# Patient Record
Sex: MALE | Race: WHITE | Employment: OTHER | URBAN - METROPOLITAN AREA
[De-identification: names, ages, dates, MRNs, and addresses within clinical notes are randomized per-mention and may not be internally consistent; named-entity substitution may affect disease eponyms.]

---

## 2017-01-11 NOTE — PATIENT DISCUSSION
Diabetes without Retinopathy Counseling:  I have discussed with the patient the importance of controlling blood glucose, blood pressure and lipid levels to minimize the risk of developing retinal disease from diabetes. Explained the importance of annual dilated eye exams because effective treatment for diabetic retinopathy depends on timely intervention. The patient was instructed to call or return sooner if they noticed blurred or distorted vision, fluctuating vision, pain or redness around one or both eyes. Return for follow-up as scheduled.

## 2017-01-11 NOTE — PATIENT DISCUSSION
Continue: Refresh Tears (carboxymethylcellulose sodium): drops: 0.5% 1 drop once a day as needed into both eyes

## 2017-01-11 NOTE — PATIENT DISCUSSION
GLAUCOMA SUSPECT, OU : ENLARGED OPTIC NERVE CUPPING AND POSITIVE FAMILY HISTORY. RETURN FOR FOLLOW UP AS SCHEDULED.

## 2017-01-11 NOTE — PATIENT DISCUSSION
*PCF OS; BECOMING VISUALLY SIGNIFICANT BUT NOT BOTHERSOME TO PATIENT AT THIS TIME. CONTINUE TO FOLLOW FOR NOW. OFFER SPEC RX UPDATE.

## 2018-03-27 ENCOUNTER — CONSULTATION (OUTPATIENT)
Dept: URBAN - METROPOLITAN AREA CLINIC 11 | Facility: CLINIC | Age: 83
End: 2018-03-27

## 2018-03-27 VITALS — DIASTOLIC BLOOD PRESSURE: 80 MMHG | HEIGHT: 60 IN | SYSTOLIC BLOOD PRESSURE: 120 MMHG

## 2018-03-27 ASSESSMENT — TONOMETRY
OS_IOP_MMHG: 16
OD_IOP_MMHG: 18

## 2018-03-27 ASSESSMENT — VISUAL ACUITY
OD_SC: 20/40
OS_SC: 20/30

## 2018-04-02 NOTE — PATIENT DISCUSSION
Continue: Combigan (brimonidine-timolol): drops: 0.2-0.5% 1 drop twice a day as directed into left eye

## 2018-04-02 NOTE — PATIENT DISCUSSION
EXPOSURE KERATOCONJUNCTIVITIS - ADD EMYCIN DELFINO QHS. AT'S' QID   ADD WARM COMPRESSES AND EYELID SCRUBS DAILY.  WILL RECOMMEND CONSIDER PUNCTAL PLUGS  IF NOT RESPONSIVE

## 2018-04-24 ENCOUNTER — FOLLOW UP (OUTPATIENT)
Dept: URBAN - METROPOLITAN AREA CLINIC 11 | Facility: CLINIC | Age: 83
End: 2018-04-24

## 2018-04-24 ASSESSMENT — VISUAL ACUITY
OD_PH: 20/30-3
OD_SC: 20/40
OS_SC: 20/20-2

## 2018-04-24 ASSESSMENT — TONOMETRY: OD_IOP_MMHG: 23

## 2018-05-04 NOTE — PATIENT DISCUSSION
MEIBOMIAN GLAND DYSFUNCTION, OU: CONTINUE WARM COMPRESSES AND EYELID SCRUBS QD-BID, ARTIFICIAL TEARS BID-QID, THE DAILY INTAKE OF OMEGA-3 FATTY ACIDS

## 2018-05-04 NOTE — PATIENT DISCUSSION
SECONDARY GLAUCOMA WITH IRIS NV OS - LIKELY RELATED TO DIABETIC RETINOPATHY. CONTINUE COMBIGAN BID OU. REFER TO DR Shakeel Willis.

## 2018-05-04 NOTE — PATIENT DISCUSSION
Hector Nieves M.D.: Reason: [facility] AND [address] [place] [address]

## 2018-10-09 NOTE — PATIENT DISCUSSION
Patient presents w/ significant lag and exposure of the left eye. Recommend aggressive lubrication near term. Consider possible tarsorrhaphy if symptoms do not improve or worsen. Follow up w/ Dr. Chavez Form in one month. Will need approval from Power of  if/when a procedure is recommended.

## 2018-11-09 NOTE — PATIENT DISCUSSION
SECONDARY GLAUCOMA WITH IRIS NV OS - LIKELY RELATED TO DIABETIC RETINOPATHY. STOP  COMBIGAN AND LATANOPROST. CONTINUE ZIOPTAN QHS ANS COSOPT BID OU AND ALPHAGAN TID OU. Simon Pinna Dr. Fanny Nyhan.

## 2018-12-14 ENCOUNTER — FOLLOW UP (OUTPATIENT)
Dept: URBAN - METROPOLITAN AREA CLINIC 11 | Facility: CLINIC | Age: 83
End: 2018-12-14

## 2018-12-17 ASSESSMENT — VISUAL ACUITY
OS_SC: 20/30+2
OD_SC: 20/40+2

## 2018-12-17 ASSESSMENT — TONOMETRY: OD_IOP_MMHG: 19

## 2019-06-06 ENCOUNTER — FOLLOW UP (OUTPATIENT)
Dept: URBAN - METROPOLITAN AREA CLINIC 11 | Facility: CLINIC | Age: 84
End: 2019-06-06

## 2019-06-06 ASSESSMENT — VISUAL ACUITY
OD_SC: 20/30+1
OS_SC: 20/30+2

## 2019-06-06 ASSESSMENT — TONOMETRY
OS_IOP_MMHG: 12
OD_IOP_MMHG: 15

## 2019-07-11 ENCOUNTER — FOLLOW UP (OUTPATIENT)
Dept: URBAN - METROPOLITAN AREA CLINIC 11 | Facility: CLINIC | Age: 84
End: 2019-07-11

## 2019-07-11 ASSESSMENT — VISUAL ACUITY
OS_SC: 20/30+2
OD_SC: 20/30

## 2019-08-14 ENCOUNTER — IMPORTED ENCOUNTER (OUTPATIENT)
Dept: URBAN - METROPOLITAN AREA CLINIC 9 | Facility: CLINIC | Age: 84
End: 2019-08-14

## 2019-10-11 ENCOUNTER — IMPORTED ENCOUNTER (OUTPATIENT)
Dept: URBAN - METROPOLITAN AREA CLINIC 9 | Facility: CLINIC | Age: 84
End: 2019-10-11

## 2019-10-22 ENCOUNTER — FOLLOW UP (OUTPATIENT)
Dept: URBAN - METROPOLITAN AREA CLINIC 11 | Facility: CLINIC | Age: 84
End: 2019-10-22

## 2019-10-22 ASSESSMENT — TONOMETRY
OD_IOP_MMHG: 19
OS_IOP_MMHG: 17

## 2019-10-22 ASSESSMENT — VISUAL ACUITY
OD_SC: 20/30-2
OS_SC: 20/30-1

## 2019-12-05 ENCOUNTER — FOLLOW UP (OUTPATIENT)
Dept: URBAN - METROPOLITAN AREA CLINIC 11 | Facility: CLINIC | Age: 84
End: 2019-12-05

## 2019-12-05 ASSESSMENT — VISUAL ACUITY
OS_SC: 20/30
OD_PH: 20/30-2
OD_SC: 20/60-1

## 2019-12-05 ASSESSMENT — TONOMETRY: OD_IOP_MMHG: 19

## 2020-03-05 NOTE — PATIENT DISCUSSION
GLAUCOMA SUSPECT, OU : ENLARGED OPTIC NERVE CUPPING &amp; POSITIVE FAMILY HISTORY. RETURN FOR FOLLOW UP AS SCHEDULED.

## 2020-03-05 NOTE — PATIENT DISCUSSION
*BLEPHARITIS, OS: PRESCRIBE WARM COMPRESSES AND EYELID SCRUBS QD-BID, ARTIFICIAL TEARS BID-QID, THE DAILY INTAKE OF OMEGA-3 FATTY ACIDS AND USE CELLUVISC Q HS OU. RETURN FOR FOLLOW-UP AS SCHEDULED.

## 2020-03-12 ENCOUNTER — FOLLOW UP (OUTPATIENT)
Dept: URBAN - METROPOLITAN AREA CLINIC 11 | Facility: CLINIC | Age: 85
End: 2020-03-12

## 2020-03-12 ASSESSMENT — VISUAL ACUITY
OS_SC: 20/25-2
OD_SC: 20/40+1

## 2020-03-12 ASSESSMENT — TONOMETRY
OD_IOP_MMHG: 19
OS_IOP_MMHG: 15

## 2020-04-23 ENCOUNTER — FOLLOW UP (OUTPATIENT)
Dept: URBAN - METROPOLITAN AREA CLINIC 11 | Facility: CLINIC | Age: 85
End: 2020-04-23

## 2020-04-23 ASSESSMENT — TONOMETRY: OD_IOP_MMHG: 20

## 2020-04-23 ASSESSMENT — VISUAL ACUITY
OD_SC: 20/60
OS_SC: 20/25-1

## 2020-07-09 ENCOUNTER — FOLLOW UP (OUTPATIENT)
Dept: URBAN - METROPOLITAN AREA CLINIC 11 | Facility: CLINIC | Age: 85
End: 2020-07-09

## 2020-07-09 ASSESSMENT — VISUAL ACUITY
OS_SC: 20/25
OD_SC: 20/40

## 2020-07-09 ASSESSMENT — TONOMETRY: OD_IOP_MMHG: 11

## 2020-08-11 ENCOUNTER — FOLLOW UP (OUTPATIENT)
Dept: URBAN - METROPOLITAN AREA CLINIC 11 | Facility: CLINIC | Age: 85
End: 2020-08-11

## 2020-08-11 ASSESSMENT — VISUAL ACUITY
OD_PH: 20/25-2
OD_SC: 20/40-2
OS_SC: 20/25

## 2020-08-11 ASSESSMENT — TONOMETRY: OD_IOP_MMHG: 17

## 2020-09-22 ENCOUNTER — FOLLOW UP (OUTPATIENT)
Dept: URBAN - METROPOLITAN AREA CLINIC 11 | Facility: CLINIC | Age: 85
End: 2020-09-22

## 2020-09-22 ASSESSMENT — VISUAL ACUITY
OD_SC: 20/40-2
OS_SC: 20/25-2

## 2020-09-22 ASSESSMENT — TONOMETRY
OS_IOP_MMHG: 18
OD_IOP_MMHG: 16

## 2020-11-24 ENCOUNTER — FOLLOW UP (OUTPATIENT)
Dept: URBAN - METROPOLITAN AREA CLINIC 11 | Facility: CLINIC | Age: 85
End: 2020-11-24

## 2020-11-24 ASSESSMENT — TONOMETRY
OS_IOP_MMHG: 21
OD_IOP_MMHG: 22

## 2020-11-24 ASSESSMENT — VISUAL ACUITY
OD_SC: 20/40
OS_SC: 20/25+2

## 2021-01-22 ENCOUNTER — FOLLOW UP (OUTPATIENT)
Dept: URBAN - METROPOLITAN AREA CLINIC 11 | Facility: CLINIC | Age: 86
End: 2021-01-22

## 2021-01-22 ASSESSMENT — TONOMETRY
OS_IOP_MMHG: 12
OD_IOP_MMHG: 14

## 2021-01-22 ASSESSMENT — VISUAL ACUITY
OD_SC: 20/60-2
OS_SC: 20/25-2

## 2021-02-19 ENCOUNTER — FOLLOW UP (OUTPATIENT)
Dept: URBAN - METROPOLITAN AREA CLINIC 11 | Facility: CLINIC | Age: 86
End: 2021-02-19

## 2021-02-19 ASSESSMENT — VISUAL ACUITY
OS_SC: 20/25
OD_PH: 20/30-2
OD_SC: 20/70+1

## 2021-02-19 ASSESSMENT — TONOMETRY: OD_IOP_MMHG: 20

## 2021-03-30 ENCOUNTER — FOLLOW UP (OUTPATIENT)
Dept: URBAN - METROPOLITAN AREA CLINIC 11 | Facility: CLINIC | Age: 86
End: 2021-03-30

## 2021-03-30 ASSESSMENT — TONOMETRY: OD_IOP_MMHG: 18

## 2021-03-30 ASSESSMENT — VISUAL ACUITY
OD_PH: 20/50
OS_SC: 20/30-1
OD_SC: 20/70+2

## 2021-05-27 ENCOUNTER — FOLLOW UP (OUTPATIENT)
Dept: URBAN - METROPOLITAN AREA CLINIC 11 | Facility: CLINIC | Age: 86
End: 2021-05-27

## 2021-05-27 DIAGNOSIS — H35.3211: ICD-10-CM

## 2021-05-27 DIAGNOSIS — H43.813: ICD-10-CM

## 2021-05-27 DIAGNOSIS — H35.3122: ICD-10-CM

## 2021-05-27 PROCEDURE — 99214 OFFICE O/P EST MOD 30 MIN: CPT

## 2021-05-27 PROCEDURE — 67028 INJECTION EYE DRUG: CPT

## 2021-05-27 PROCEDURE — 92134 CPTRZ OPH DX IMG PST SGM RTA: CPT

## 2021-05-27 ASSESSMENT — VISUAL ACUITY
OS_SC: 20/25
OD_SC: 20/100+1
OD_PH: 20/70

## 2021-05-27 ASSESSMENT — TONOMETRY
OS_IOP_MMHG: 15
OD_IOP_MMHG: 19

## 2021-07-08 ENCOUNTER — FOLLOW UP (OUTPATIENT)
Dept: URBAN - METROPOLITAN AREA CLINIC 11 | Facility: CLINIC | Age: 86
End: 2021-07-08

## 2021-07-08 DIAGNOSIS — H35.3211: ICD-10-CM

## 2021-07-08 PROCEDURE — 92134 CPTRZ OPH DX IMG PST SGM RTA: CPT

## 2021-07-08 PROCEDURE — 99213 OFFICE O/P EST LOW 20 MIN: CPT

## 2021-07-08 ASSESSMENT — VISUAL ACUITY
OD_SC: 20/60-2
OS_SC: 20/25

## 2021-07-08 ASSESSMENT — TONOMETRY: OD_IOP_MMHG: 16

## 2021-08-24 ENCOUNTER — FOLLOW UP (OUTPATIENT)
Dept: URBAN - METROPOLITAN AREA CLINIC 11 | Facility: CLINIC | Age: 86
End: 2021-08-24

## 2021-08-24 DIAGNOSIS — H35.3211: ICD-10-CM

## 2021-08-24 PROCEDURE — 92134 CPTRZ OPH DX IMG PST SGM RTA: CPT

## 2021-08-24 PROCEDURE — 99213 OFFICE O/P EST LOW 20 MIN: CPT

## 2021-08-24 ASSESSMENT — VISUAL ACUITY
OD_SC: 20/80+1
OS_SC: 20/30

## 2021-08-24 ASSESSMENT — TONOMETRY: OD_IOP_MMHG: 17

## 2021-10-16 ASSESSMENT — VISUAL ACUITY
OS_SC: 20/40 - SN
OD_SC: 20/40 SN
OS_SC: 20/40 - SN
OD_SC: 20/40 SN

## 2021-10-26 ENCOUNTER — FOLLOW UP (OUTPATIENT)
Dept: URBAN - METROPOLITAN AREA CLINIC 11 | Facility: CLINIC | Age: 86
End: 2021-10-26

## 2021-10-26 DIAGNOSIS — H35.3122: ICD-10-CM

## 2021-10-26 DIAGNOSIS — H43.813: ICD-10-CM

## 2021-10-26 DIAGNOSIS — H35.3211: ICD-10-CM

## 2021-10-26 PROCEDURE — 92134 CPTRZ OPH DX IMG PST SGM RTA: CPT

## 2021-10-26 PROCEDURE — 99214 OFFICE O/P EST MOD 30 MIN: CPT

## 2021-10-26 ASSESSMENT — TONOMETRY
OS_IOP_MMHG: 13
OD_IOP_MMHG: 19

## 2021-10-26 ASSESSMENT — VISUAL ACUITY
OD_SC: 20/60+2
OS_SC: 20/30+1

## 2021-12-28 ENCOUNTER — FOLLOW UP (OUTPATIENT)
Dept: URBAN - METROPOLITAN AREA CLINIC 11 | Facility: CLINIC | Age: 86
End: 2021-12-28

## 2021-12-28 DIAGNOSIS — H35.3211: ICD-10-CM

## 2021-12-28 DIAGNOSIS — H43.813: ICD-10-CM

## 2021-12-28 DIAGNOSIS — H35.3122: ICD-10-CM

## 2021-12-28 PROCEDURE — 92134 CPTRZ OPH DX IMG PST SGM RTA: CPT

## 2021-12-28 PROCEDURE — 99213 OFFICE O/P EST LOW 20 MIN: CPT

## 2021-12-28 PROCEDURE — 67028 INJECTION EYE DRUG: CPT

## 2021-12-28 ASSESSMENT — TONOMETRY
OS_IOP_MMHG: 18
OD_IOP_MMHG: 22

## 2021-12-28 ASSESSMENT — VISUAL ACUITY
OS_PH: 20/25
OD_SC: 20/200
OS_SC: 20/30

## 2022-02-08 ENCOUNTER — CLINIC PROCEDURE ONLY (OUTPATIENT)
Dept: URBAN - METROPOLITAN AREA CLINIC 11 | Facility: CLINIC | Age: 87
End: 2022-02-08

## 2022-02-08 DIAGNOSIS — H35.3211: ICD-10-CM

## 2022-02-08 PROCEDURE — 67028 INJECTION EYE DRUG: CPT

## 2022-02-08 ASSESSMENT — VISUAL ACUITY
OD_SC: 20/70+1
OS_PH: 20/25
OS_SC: 20/30-2

## 2022-02-08 ASSESSMENT — TONOMETRY
OS_IOP_MMHG: 14
OD_IOP_MMHG: 20

## 2022-03-11 ENCOUNTER — CLINIC PROCEDURE ONLY (OUTPATIENT)
Dept: URBAN - METROPOLITAN AREA CLINIC 11 | Facility: CLINIC | Age: 87
End: 2022-03-11

## 2022-03-11 DIAGNOSIS — H35.3211: ICD-10-CM

## 2022-03-11 PROCEDURE — 67028 INJECTION EYE DRUG: CPT

## 2022-03-11 ASSESSMENT — TONOMETRY
OS_IOP_MMHG: 17
OD_IOP_MMHG: 21

## 2022-03-11 ASSESSMENT — VISUAL ACUITY
OD_SC: 20/40-3
OS_SC: 20/25-1

## 2022-04-22 ENCOUNTER — FOLLOW UP (OUTPATIENT)
Dept: URBAN - METROPOLITAN AREA CLINIC 11 | Facility: CLINIC | Age: 87
End: 2022-04-22

## 2022-04-22 DIAGNOSIS — H35.3122: ICD-10-CM

## 2022-04-22 DIAGNOSIS — H35.3211: ICD-10-CM

## 2022-04-22 PROCEDURE — 92134 CPTRZ OPH DX IMG PST SGM RTA: CPT

## 2022-04-22 PROCEDURE — 99214 OFFICE O/P EST MOD 30 MIN: CPT

## 2022-04-22 ASSESSMENT — TONOMETRY
OS_IOP_MMHG: 16
OD_IOP_MMHG: 19

## 2022-04-22 ASSESSMENT — VISUAL ACUITY
OS_SC: 20/25-2
OD_SC: 20/40+2

## 2022-05-06 ENCOUNTER — ESTABLISHED PATIENT (OUTPATIENT)
Dept: URBAN - METROPOLITAN AREA CLINIC 14 | Facility: CLINIC | Age: 87
End: 2022-05-06

## 2022-05-06 DIAGNOSIS — H02.422: ICD-10-CM

## 2022-05-06 DIAGNOSIS — H02.112: ICD-10-CM

## 2022-05-06 PROCEDURE — 92285 EXTERNAL OCULAR PHOTOGRAPHY: CPT

## 2022-05-06 PROCEDURE — 99214 OFFICE O/P EST MOD 30 MIN: CPT

## 2022-05-06 ASSESSMENT — VISUAL ACUITY
OS_SC: 20/25-2
OD_SC: 20/40+2

## 2022-05-27 ENCOUNTER — ESTABLISHED PATIENT (OUTPATIENT)
Dept: URBAN - METROPOLITAN AREA CLINIC 11 | Facility: CLINIC | Age: 87
End: 2022-05-27

## 2022-05-27 DIAGNOSIS — H43.813: ICD-10-CM

## 2022-05-27 DIAGNOSIS — H35.3211: ICD-10-CM

## 2022-05-27 DIAGNOSIS — H35.3122: ICD-10-CM

## 2022-05-27 PROCEDURE — 92134 CPTRZ OPH DX IMG PST SGM RTA: CPT

## 2022-05-27 PROCEDURE — 99214 OFFICE O/P EST MOD 30 MIN: CPT

## 2022-05-27 PROCEDURE — 67028 INJECTION EYE DRUG: CPT

## 2022-05-27 ASSESSMENT — VISUAL ACUITY
OD_SC: 20/60-1
OS_SC: 20/25

## 2022-05-27 ASSESSMENT — TONOMETRY
OD_IOP_MMHG: 18
OS_IOP_MMHG: 13

## 2022-06-19 RX ORDER — PREDNISONE 10 MG/1
TABLET ORAL
COMMUNITY
Start: 2021-12-01

## 2022-06-19 RX ORDER — DUTASTERIDE 0.5 MG/1
2 CAPSULE, LIQUID FILLED ORAL
COMMUNITY

## 2022-06-19 RX ORDER — TERAZOSIN 2 MG/1
1 CAPSULE ORAL
COMMUNITY

## 2022-06-19 RX ORDER — ALFUZOSIN HYDROCHLORIDE 10 MG/1
TABLET, EXTENDED RELEASE ORAL
COMMUNITY

## 2022-06-19 RX ORDER — ASPIRIN 81 MG/1
TABLET, CHEWABLE ORAL
COMMUNITY

## 2022-06-19 RX ORDER — SIMVASTATIN 20 MG
TABLET ORAL
COMMUNITY

## 2022-06-19 RX ORDER — PANTOPRAZOLE SODIUM 40 MG/1
TABLET, DELAYED RELEASE ORAL
COMMUNITY

## 2022-06-24 ENCOUNTER — CLINIC PROCEDURE ONLY (OUTPATIENT)
Dept: URBAN - METROPOLITAN AREA CLINIC 11 | Facility: CLINIC | Age: 87
End: 2022-06-24

## 2022-06-24 DIAGNOSIS — H35.3211: ICD-10-CM

## 2022-06-24 PROCEDURE — 67028 INJECTION EYE DRUG: CPT

## 2022-06-24 ASSESSMENT — VISUAL ACUITY
OD_SC: 20/70-1
OD_PH: 20/40
OS_SC: 20/30-1

## 2022-06-24 ASSESSMENT — TONOMETRY
OD_IOP_MMHG: 10
OS_IOP_MMHG: 13

## 2022-08-05 ENCOUNTER — FOLLOW UP (OUTPATIENT)
Dept: URBAN - METROPOLITAN AREA CLINIC 11 | Facility: CLINIC | Age: 87
End: 2022-08-05

## 2022-08-05 DIAGNOSIS — H35.3122: ICD-10-CM

## 2022-08-05 DIAGNOSIS — H43.813: ICD-10-CM

## 2022-08-05 DIAGNOSIS — H35.3211: ICD-10-CM

## 2022-08-05 PROCEDURE — 99214 OFFICE O/P EST MOD 30 MIN: CPT

## 2022-08-05 PROCEDURE — 92134 CPTRZ OPH DX IMG PST SGM RTA: CPT

## 2022-08-05 ASSESSMENT — VISUAL ACUITY
OU_SC: 20/25
OD_SC: 20/70
OD_PH: 20/40
OS_SC: 20/25+1

## 2022-08-05 ASSESSMENT — TONOMETRY
OD_IOP_MMHG: 12
OS_IOP_MMHG: 12

## 2022-09-16 ENCOUNTER — FOLLOW UP (OUTPATIENT)
Dept: URBAN - METROPOLITAN AREA CLINIC 11 | Facility: CLINIC | Age: 87
End: 2022-09-16

## 2022-09-16 DIAGNOSIS — H35.3211: ICD-10-CM

## 2022-09-16 PROCEDURE — 92134 CPTRZ OPH DX IMG PST SGM RTA: CPT

## 2022-09-16 PROCEDURE — 67028 INJECTION EYE DRUG: CPT

## 2022-09-16 PROCEDURE — 99213 OFFICE O/P EST LOW 20 MIN: CPT

## 2022-09-16 ASSESSMENT — TONOMETRY
OS_IOP_MMHG: 11
OD_IOP_MMHG: 13

## 2022-09-16 ASSESSMENT — VISUAL ACUITY
OD_PH: 20/40-1
OS_SC: 20/40-2
OD_SC: 20/70-2
OS_PH: 20/30-2

## 2022-09-21 ENCOUNTER — POST-OP (OUTPATIENT)
Dept: URBAN - METROPOLITAN AREA CLINIC 14 | Facility: CLINIC | Age: 87
End: 2022-09-21

## 2022-09-21 DIAGNOSIS — H02.422: ICD-10-CM

## 2022-09-21 DIAGNOSIS — Z98.890: ICD-10-CM

## 2022-09-21 PROCEDURE — 99024 POSTOP FOLLOW-UP VISIT: CPT

## 2022-09-21 ASSESSMENT — VISUAL ACUITY
OD_SC: 20/70-2
OS_SC: 20/40-2

## 2022-10-20 ENCOUNTER — CLINIC PROCEDURE ONLY (OUTPATIENT)
Dept: URBAN - METROPOLITAN AREA CLINIC 11 | Facility: CLINIC | Age: 87
End: 2022-10-20

## 2022-10-20 DIAGNOSIS — H35.3211: ICD-10-CM

## 2022-10-20 PROCEDURE — 67028 INJECTION EYE DRUG: CPT

## 2022-10-20 ASSESSMENT — VISUAL ACUITY
OS_SC: 20/40+1
OD_SC: 20/60-2

## 2022-10-20 ASSESSMENT — TONOMETRY
OS_IOP_MMHG: 13
OD_IOP_MMHG: 15

## 2022-12-02 ENCOUNTER — FOLLOW UP (OUTPATIENT)
Dept: URBAN - METROPOLITAN AREA CLINIC 11 | Facility: CLINIC | Age: 87
End: 2022-12-02

## 2022-12-02 PROCEDURE — 99214 OFFICE O/P EST MOD 30 MIN: CPT

## 2022-12-02 PROCEDURE — 92134 CPTRZ OPH DX IMG PST SGM RTA: CPT

## 2022-12-02 ASSESSMENT — VISUAL ACUITY
OS_SC: 20/30-2
OD_SC: 20/60-2

## 2022-12-02 ASSESSMENT — TONOMETRY
OD_IOP_MMHG: 14
OS_IOP_MMHG: 11

## 2023-01-27 ENCOUNTER — FOLLOW UP (OUTPATIENT)
Dept: URBAN - METROPOLITAN AREA CLINIC 11 | Facility: CLINIC | Age: 88
End: 2023-01-27

## 2023-01-27 DIAGNOSIS — H43.813: ICD-10-CM

## 2023-01-27 DIAGNOSIS — H35.3122: ICD-10-CM

## 2023-01-27 DIAGNOSIS — H35.433: ICD-10-CM

## 2023-01-27 DIAGNOSIS — H35.3211: ICD-10-CM

## 2023-01-27 DIAGNOSIS — Z96.1: ICD-10-CM

## 2023-01-27 PROCEDURE — 67028 INJECTION EYE DRUG: CPT

## 2023-01-27 PROCEDURE — 99214 OFFICE O/P EST MOD 30 MIN: CPT

## 2023-01-27 PROCEDURE — 92134 CPTRZ OPH DX IMG PST SGM RTA: CPT

## 2023-01-27 ASSESSMENT — VISUAL ACUITY
OD_SC: 20/50+1
OS_SC: 20/50-2
OS_PH: 20/30-2

## 2023-01-27 ASSESSMENT — TONOMETRY
OD_IOP_MMHG: 17
OS_IOP_MMHG: 12

## 2023-04-14 ENCOUNTER — FOLLOW UP (OUTPATIENT)
Dept: URBAN - METROPOLITAN AREA CLINIC 11 | Facility: CLINIC | Age: 88
End: 2023-04-14

## 2023-04-14 DIAGNOSIS — H43.813: ICD-10-CM

## 2023-04-14 DIAGNOSIS — H35.3211: ICD-10-CM

## 2023-04-14 DIAGNOSIS — H35.3122: ICD-10-CM

## 2023-04-14 DIAGNOSIS — H35.433: ICD-10-CM

## 2023-04-14 PROCEDURE — 92134 CPTRZ OPH DX IMG PST SGM RTA: CPT

## 2023-04-14 PROCEDURE — 99214 OFFICE O/P EST MOD 30 MIN: CPT

## 2023-04-14 ASSESSMENT — VISUAL ACUITY
OS_SC: 20/40-1
OS_PH: 20/40
OD_PH: 20/40
OD_SC: 20/70

## 2023-04-14 ASSESSMENT — TONOMETRY
OS_IOP_MMHG: 14
OD_IOP_MMHG: 16

## 2023-05-26 ENCOUNTER — FOLLOW UP (OUTPATIENT)
Dept: URBAN - METROPOLITAN AREA CLINIC 11 | Facility: CLINIC | Age: 88
End: 2023-05-26

## 2023-05-26 DIAGNOSIS — H35.3211: ICD-10-CM

## 2023-05-26 DIAGNOSIS — H35.433: ICD-10-CM

## 2023-05-26 DIAGNOSIS — H35.3122: ICD-10-CM

## 2023-05-26 DIAGNOSIS — H43.813: ICD-10-CM

## 2023-05-26 PROCEDURE — 92134 CPTRZ OPH DX IMG PST SGM RTA: CPT

## 2023-05-26 PROCEDURE — 67028 INJECTION EYE DRUG: CPT

## 2023-05-26 PROCEDURE — 99214 OFFICE O/P EST MOD 30 MIN: CPT

## 2023-05-26 ASSESSMENT — TONOMETRY
OD_IOP_MMHG: 14
OS_IOP_MMHG: 14

## 2023-05-26 ASSESSMENT — VISUAL ACUITY
OU_SC: 20/40+1
OS_SC: 20/40
OD_SC: 20/50

## 2023-06-27 ENCOUNTER — CLINIC PROCEDURE ONLY (OUTPATIENT)
Dept: URBAN - METROPOLITAN AREA CLINIC 11 | Facility: CLINIC | Age: 88
End: 2023-06-27

## 2023-06-27 DIAGNOSIS — H35.3211: ICD-10-CM

## 2023-06-27 PROCEDURE — 67028 INJECTION EYE DRUG: CPT

## 2023-06-27 ASSESSMENT — TONOMETRY
OD_IOP_MMHG: 12
OS_IOP_MMHG: 14

## 2023-06-27 ASSESSMENT — VISUAL ACUITY
OS_SC: 20/40
OD_SC: 20/50

## 2023-07-27 ENCOUNTER — CLINIC PROCEDURE ONLY (OUTPATIENT)
Dept: URBAN - METROPOLITAN AREA CLINIC 11 | Facility: CLINIC | Age: 88
End: 2023-07-27

## 2023-07-27 DIAGNOSIS — H35.3211: ICD-10-CM

## 2023-07-27 PROCEDURE — 67028 INJECTION EYE DRUG: CPT

## 2023-07-27 ASSESSMENT — VISUAL ACUITY
OD_SC: 20/70-2
OS_SC: 20/40+1

## 2023-07-27 ASSESSMENT — TONOMETRY
OS_IOP_MMHG: 14
OD_IOP_MMHG: 14

## 2023-08-15 ENCOUNTER — ESTABLISHED PATIENT (OUTPATIENT)
Dept: URBAN - METROPOLITAN AREA CLINIC 8 | Facility: CLINIC | Age: 88
End: 2023-08-15

## 2023-08-15 DIAGNOSIS — G20: ICD-10-CM

## 2023-08-15 DIAGNOSIS — H35.3122: ICD-10-CM

## 2023-08-15 DIAGNOSIS — H35.3211: ICD-10-CM

## 2023-08-15 PROCEDURE — 92014 COMPRE OPH EXAM EST PT 1/>: CPT

## 2023-08-15 ASSESSMENT — TONOMETRY
OS_IOP_MMHG: 10
OD_IOP_MMHG: 12

## 2023-08-15 ASSESSMENT — VISUAL ACUITY
OU_CC: J1+
OD_CC: J1
OS_CC: J1+
OU_CC: 20/30+1
OS_CC: 20/30-1
OS_SC: 20/50
OD_CC: 20/40-1
OD_SC: 20/100
OU_SC: 20/40-2

## 2023-08-24 ENCOUNTER — CLINIC PROCEDURE ONLY (OUTPATIENT)
Dept: URBAN - METROPOLITAN AREA CLINIC 11 | Facility: CLINIC | Age: 88
End: 2023-08-24

## 2023-08-24 DIAGNOSIS — H35.3211: ICD-10-CM

## 2023-08-24 PROCEDURE — 67028 INJECTION EYE DRUG: CPT

## 2023-08-24 ASSESSMENT — VISUAL ACUITY
OS_SC: 20/50+2
OS_PH: 20/40
OD_SC: 20/60-2

## 2023-08-24 ASSESSMENT — TONOMETRY
OS_IOP_MMHG: 13
OD_IOP_MMHG: 15

## 2023-10-05 ENCOUNTER — FOLLOW UP (OUTPATIENT)
Dept: URBAN - METROPOLITAN AREA CLINIC 11 | Facility: CLINIC | Age: 88
End: 2023-10-05

## 2023-10-05 DIAGNOSIS — H43.813: ICD-10-CM

## 2023-10-05 DIAGNOSIS — H35.3122: ICD-10-CM

## 2023-10-05 DIAGNOSIS — H35.433: ICD-10-CM

## 2023-10-05 DIAGNOSIS — H35.3211: ICD-10-CM

## 2023-10-05 PROCEDURE — 92134 CPTRZ OPH DX IMG PST SGM RTA: CPT

## 2023-10-05 PROCEDURE — 99213 OFFICE O/P EST LOW 20 MIN: CPT

## 2023-10-05 ASSESSMENT — VISUAL ACUITY
OD_PH: 20/60
OD_SC: 20/70
OS_SC: 20/40-1

## 2023-10-05 ASSESSMENT — TONOMETRY
OD_IOP_MMHG: 12
OS_IOP_MMHG: 12

## 2023-11-16 ENCOUNTER — FOLLOW UP (OUTPATIENT)
Dept: URBAN - METROPOLITAN AREA CLINIC 11 | Facility: CLINIC | Age: 88
End: 2023-11-16

## 2023-11-16 DIAGNOSIS — H35.433: ICD-10-CM

## 2023-11-16 DIAGNOSIS — H43.813: ICD-10-CM

## 2023-11-16 DIAGNOSIS — H35.3211: ICD-10-CM

## 2023-11-16 PROCEDURE — 99213 OFFICE O/P EST LOW 20 MIN: CPT

## 2023-11-16 PROCEDURE — 92134 CPTRZ OPH DX IMG PST SGM RTA: CPT

## 2023-11-16 PROCEDURE — 67028 INJECTION EYE DRUG: CPT

## 2023-11-16 ASSESSMENT — VISUAL ACUITY
OD_PH: 20/40-2
OS_SC: 20/30-2
OD_SC: 20/50-2

## 2023-11-16 ASSESSMENT — TONOMETRY
OS_IOP_MMHG: 16
OD_IOP_MMHG: 18

## 2024-01-16 ENCOUNTER — FOLLOW UP (OUTPATIENT)
Dept: URBAN - METROPOLITAN AREA CLINIC 11 | Facility: CLINIC | Age: 89
End: 2024-01-16

## 2024-01-16 DIAGNOSIS — H43.813: ICD-10-CM

## 2024-01-16 DIAGNOSIS — H35.433: ICD-10-CM

## 2024-01-16 DIAGNOSIS — H35.3211: ICD-10-CM

## 2024-01-16 DIAGNOSIS — H35.3122: ICD-10-CM

## 2024-01-16 PROCEDURE — 92134 CPTRZ OPH DX IMG PST SGM RTA: CPT

## 2024-01-16 PROCEDURE — 99214 OFFICE O/P EST MOD 30 MIN: CPT

## 2024-01-16 ASSESSMENT — VISUAL ACUITY
OD_SC: 20/70
OS_SC: 20/25-2
OU_SC: 20/25
OD_PH: 20/60

## 2024-01-16 ASSESSMENT — TONOMETRY
OS_IOP_MMHG: 14
OD_IOP_MMHG: 13

## 2024-02-29 ENCOUNTER — FOLLOW UP (OUTPATIENT)
Dept: URBAN - METROPOLITAN AREA CLINIC 11 | Facility: CLINIC | Age: 89
End: 2024-02-29

## 2024-02-29 DIAGNOSIS — H35.433: ICD-10-CM

## 2024-02-29 DIAGNOSIS — H43.813: ICD-10-CM

## 2024-02-29 DIAGNOSIS — H35.3211: ICD-10-CM

## 2024-02-29 PROCEDURE — 99213 OFFICE O/P EST LOW 20 MIN: CPT

## 2024-02-29 PROCEDURE — 92134 CPTRZ OPH DX IMG PST SGM RTA: CPT

## 2024-02-29 ASSESSMENT — TONOMETRY
OS_IOP_MMHG: 13
OD_IOP_MMHG: 15

## 2024-02-29 ASSESSMENT — VISUAL ACUITY
OD_PH: 20/40
OD_SC: 20/70+1
OS_SC: 20/25-2

## 2024-03-28 ENCOUNTER — FOLLOW UP (OUTPATIENT)
Dept: URBAN - METROPOLITAN AREA CLINIC 11 | Facility: CLINIC | Age: 89
End: 2024-03-28

## 2024-03-28 DIAGNOSIS — H35.3122: ICD-10-CM

## 2024-03-28 DIAGNOSIS — H35.3211: ICD-10-CM

## 2024-03-28 DIAGNOSIS — H35.433: ICD-10-CM

## 2024-03-28 DIAGNOSIS — H43.813: ICD-10-CM

## 2024-03-28 PROCEDURE — 92134 CPTRZ OPH DX IMG PST SGM RTA: CPT

## 2024-03-28 PROCEDURE — 99213 OFFICE O/P EST LOW 20 MIN: CPT

## 2024-03-28 ASSESSMENT — VISUAL ACUITY
OS_SC: 20/30-2
OD_SC: 20/60-1
OD_PH: 20/40-2

## 2024-03-28 ASSESSMENT — TONOMETRY
OD_IOP_MMHG: 18
OS_IOP_MMHG: 13

## 2024-05-30 ENCOUNTER — FOLLOW UP (OUTPATIENT)
Dept: URBAN - METROPOLITAN AREA CLINIC 11 | Facility: CLINIC | Age: 89
End: 2024-05-30

## 2024-05-30 DIAGNOSIS — H35.433: ICD-10-CM

## 2024-05-30 DIAGNOSIS — H43.813: ICD-10-CM

## 2024-05-30 DIAGNOSIS — H35.3124: ICD-10-CM

## 2024-05-30 DIAGNOSIS — H35.3211: ICD-10-CM

## 2024-05-30 PROCEDURE — 92134 CPTRZ OPH DX IMG PST SGM RTA: CPT

## 2024-05-30 PROCEDURE — 99214 OFFICE O/P EST MOD 30 MIN: CPT | Mod: 25

## 2024-05-30 PROCEDURE — 67028 INJECTION EYE DRUG: CPT

## 2024-05-30 ASSESSMENT — VISUAL ACUITY
OS_SC: 20/30
OD_SC: 20/80+1
OD_PH: 20/40

## 2024-05-30 ASSESSMENT — TONOMETRY
OS_IOP_MMHG: 16
OD_IOP_MMHG: 21

## 2024-08-08 ENCOUNTER — FOLLOW UP (OUTPATIENT)
Dept: URBAN - METROPOLITAN AREA CLINIC 11 | Facility: CLINIC | Age: 89
End: 2024-08-08

## 2024-08-08 DIAGNOSIS — H35.3211: ICD-10-CM

## 2024-08-08 DIAGNOSIS — H43.813: ICD-10-CM

## 2024-08-08 DIAGNOSIS — H35.433: ICD-10-CM

## 2024-08-08 DIAGNOSIS — H35.3124: ICD-10-CM

## 2024-08-08 PROCEDURE — 99213 OFFICE O/P EST LOW 20 MIN: CPT

## 2024-08-08 PROCEDURE — 92134 CPTRZ OPH DX IMG PST SGM RTA: CPT

## 2024-08-08 ASSESSMENT — TONOMETRY
OD_IOP_MMHG: 15
OS_IOP_MMHG: 13

## 2024-08-08 ASSESSMENT — VISUAL ACUITY
OD_PH: 20/40
OS_PH: 20/30-2
OS_SC: 20/40+2
OD_SC: 20/60-2

## 2024-09-19 ENCOUNTER — FOLLOW UP (OUTPATIENT)
Dept: URBAN - METROPOLITAN AREA CLINIC 11 | Facility: CLINIC | Age: 89
End: 2024-09-19

## 2024-09-19 DIAGNOSIS — H43.813: ICD-10-CM

## 2024-09-19 DIAGNOSIS — H35.3124: ICD-10-CM

## 2024-09-19 DIAGNOSIS — H35.3211: ICD-10-CM

## 2024-09-19 DIAGNOSIS — H35.433: ICD-10-CM

## 2024-09-19 PROCEDURE — 99213 OFFICE O/P EST LOW 20 MIN: CPT | Mod: 25

## 2024-09-19 PROCEDURE — 92134 CPTRZ OPH DX IMG PST SGM RTA: CPT

## 2024-09-19 PROCEDURE — 67028 INJECTION EYE DRUG: CPT

## 2024-10-31 ENCOUNTER — FOLLOW UP (OUTPATIENT)
Dept: URBAN - METROPOLITAN AREA CLINIC 11 | Facility: CLINIC | Age: 89
End: 2024-10-31

## 2024-10-31 DIAGNOSIS — H35.3124: ICD-10-CM

## 2024-10-31 DIAGNOSIS — H35.433: ICD-10-CM

## 2024-10-31 DIAGNOSIS — H35.3211: ICD-10-CM

## 2024-10-31 DIAGNOSIS — H43.813: ICD-10-CM

## 2024-10-31 PROCEDURE — 92134 CPTRZ OPH DX IMG PST SGM RTA: CPT

## 2024-10-31 PROCEDURE — 99213 OFFICE O/P EST LOW 20 MIN: CPT

## 2024-12-10 ENCOUNTER — FOLLOW UP (OUTPATIENT)
Age: 89
End: 2024-12-10

## 2024-12-10 DIAGNOSIS — H35.433: ICD-10-CM

## 2024-12-10 DIAGNOSIS — H35.3211: ICD-10-CM

## 2024-12-10 DIAGNOSIS — H35.3124: ICD-10-CM

## 2024-12-10 DIAGNOSIS — H43.813: ICD-10-CM

## 2024-12-10 PROCEDURE — 67028 INJECTION EYE DRUG: CPT

## 2024-12-10 PROCEDURE — 92134 CPTRZ OPH DX IMG PST SGM RTA: CPT

## 2024-12-10 PROCEDURE — 99213 OFFICE O/P EST LOW 20 MIN: CPT | Mod: 25

## 2025-01-30 ENCOUNTER — FOLLOW UP WITH CLINIC PROCEDURE (OUTPATIENT)
Age: OVER 89
End: 2025-01-30

## 2025-01-30 DIAGNOSIS — H35.3211: ICD-10-CM

## 2025-01-30 DIAGNOSIS — H35.3124: ICD-10-CM

## 2025-01-30 DIAGNOSIS — H35.433: ICD-10-CM

## 2025-01-30 DIAGNOSIS — H43.813: ICD-10-CM

## 2025-01-30 PROCEDURE — 99214 OFFICE O/P EST MOD 30 MIN: CPT | Mod: 25

## 2025-01-30 PROCEDURE — 67028 INJECTION EYE DRUG: CPT

## 2025-01-30 PROCEDURE — 92134 CPTRZ OPH DX IMG PST SGM RTA: CPT

## 2025-03-13 ENCOUNTER — FOLLOW UP WITH CLINIC PROCEDURE (OUTPATIENT)
Age: OVER 89
End: 2025-03-13

## 2025-03-13 DIAGNOSIS — H35.3211: ICD-10-CM

## 2025-03-13 DIAGNOSIS — Z96.1: ICD-10-CM

## 2025-03-13 DIAGNOSIS — H35.3124: ICD-10-CM

## 2025-03-13 DIAGNOSIS — G20: ICD-10-CM

## 2025-03-13 DIAGNOSIS — H43.813: ICD-10-CM

## 2025-03-13 DIAGNOSIS — H35.433: ICD-10-CM

## 2025-03-13 PROCEDURE — 99213 OFFICE O/P EST LOW 20 MIN: CPT | Mod: 25

## 2025-03-13 PROCEDURE — 67028 INJECTION EYE DRUG: CPT

## 2025-03-13 PROCEDURE — 92134 CPTRZ OPH DX IMG PST SGM RTA: CPT

## 2025-04-24 ENCOUNTER — FOLLOW UP WITH CLINIC PROCEDURE (OUTPATIENT)
Age: OVER 89
End: 2025-04-24

## 2025-04-24 DIAGNOSIS — G20: ICD-10-CM

## 2025-04-24 DIAGNOSIS — H35.3211: ICD-10-CM

## 2025-04-24 DIAGNOSIS — Z96.1: ICD-10-CM

## 2025-04-24 DIAGNOSIS — H35.3124: ICD-10-CM

## 2025-04-24 DIAGNOSIS — H43.813: ICD-10-CM

## 2025-04-24 DIAGNOSIS — H35.433: ICD-10-CM

## 2025-04-24 PROCEDURE — 99213 OFFICE O/P EST LOW 20 MIN: CPT | Mod: 25

## 2025-04-24 PROCEDURE — 67028 INJECTION EYE DRUG: CPT

## 2025-04-24 PROCEDURE — 92134 CPTRZ OPH DX IMG PST SGM RTA: CPT

## 2025-06-05 ENCOUNTER — FOLLOW UP WITH CLINIC PROCEDURE (OUTPATIENT)
Age: OVER 89
End: 2025-06-05

## 2025-06-05 DIAGNOSIS — H43.813: ICD-10-CM

## 2025-06-05 DIAGNOSIS — G20: ICD-10-CM

## 2025-06-05 DIAGNOSIS — H35.3124: ICD-10-CM

## 2025-06-05 DIAGNOSIS — H35.3211: ICD-10-CM

## 2025-06-05 DIAGNOSIS — H35.433: ICD-10-CM

## 2025-06-05 DIAGNOSIS — Z96.1: ICD-10-CM

## 2025-06-05 PROCEDURE — 92134 CPTRZ OPH DX IMG PST SGM RTA: CPT

## 2025-06-05 PROCEDURE — 67028 INJECTION EYE DRUG: CPT

## 2025-06-05 PROCEDURE — 99213 OFFICE O/P EST LOW 20 MIN: CPT | Mod: 25

## 2025-07-17 ENCOUNTER — DUPLICATE ENCOUNTER (OUTPATIENT)
Age: OVER 89
End: 2025-07-17

## 2025-07-17 ENCOUNTER — FOLLOW UP WITH CLINIC PROCEDURE (OUTPATIENT)
Age: OVER 89
End: 2025-07-17

## 2025-07-17 DIAGNOSIS — H35.3124: ICD-10-CM

## 2025-07-17 DIAGNOSIS — H35.3211: ICD-10-CM

## 2025-07-17 PROCEDURE — 92134 CPTRZ OPH DX IMG PST SGM RTA: CPT

## 2025-07-17 PROCEDURE — 67028 INJECTION EYE DRUG: CPT

## 2025-08-28 ENCOUNTER — CLINIC PROCEDURE ONLY (OUTPATIENT)
Age: OVER 89
End: 2025-08-28

## 2025-08-28 DIAGNOSIS — H35.3124: ICD-10-CM

## 2025-08-28 DIAGNOSIS — H35.3211: ICD-10-CM

## 2025-08-28 PROCEDURE — 67028 INJECTION EYE DRUG: CPT

## 2025-08-28 PROCEDURE — 92134 CPTRZ OPH DX IMG PST SGM RTA: CPT
